# Patient Record
Sex: FEMALE
[De-identification: names, ages, dates, MRNs, and addresses within clinical notes are randomized per-mention and may not be internally consistent; named-entity substitution may affect disease eponyms.]

---

## 2021-12-13 ENCOUNTER — HOSPITAL ENCOUNTER (OUTPATIENT)
Dept: HOSPITAL 47 - PROCWHC3 | Age: 26
End: 2021-12-13
Attending: OBSTETRICS & GYNECOLOGY
Payer: COMMERCIAL

## 2021-12-13 VITALS — DIASTOLIC BLOOD PRESSURE: 78 MMHG | HEART RATE: 61 BPM | SYSTOLIC BLOOD PRESSURE: 116 MMHG

## 2021-12-13 VITALS — TEMPERATURE: 95.9 F

## 2021-12-13 VITALS — RESPIRATION RATE: 16 BRPM

## 2021-12-13 DIAGNOSIS — O98.519: Primary | ICD-10-CM

## 2021-12-13 DIAGNOSIS — Z3A.00: ICD-10-CM

## 2021-12-13 DIAGNOSIS — U07.1: ICD-10-CM

## 2021-12-13 DIAGNOSIS — Z88.3: ICD-10-CM

## 2021-12-13 PROCEDURE — 99202 OFFICE O/P NEW SF 15 MIN: CPT

## 2021-12-13 PROCEDURE — 96375 TX/PRO/DX INJ NEW DRUG ADDON: CPT

## 2021-12-13 PROCEDURE — 96360 HYDRATION IV INFUSION INIT: CPT

## 2021-12-16 ENCOUNTER — HOSPITAL ENCOUNTER (INPATIENT)
Dept: HOSPITAL 47 - 4FBP | Age: 26
LOS: 1 days | Discharge: HOME | End: 2021-12-17
Attending: OBSTETRICS & GYNECOLOGY | Admitting: OBSTETRICS & GYNECOLOGY
Payer: COMMERCIAL

## 2021-12-16 DIAGNOSIS — Z3A.39: ICD-10-CM

## 2021-12-16 DIAGNOSIS — O26.893: ICD-10-CM

## 2021-12-16 DIAGNOSIS — Z67.41: ICD-10-CM

## 2021-12-16 LAB
BASOPHILS # BLD AUTO: 0 K/UL (ref 0–0.2)
BASOPHILS NFR BLD AUTO: 0 %
EOSINOPHIL # BLD AUTO: 0.1 K/UL (ref 0–0.7)
EOSINOPHIL NFR BLD AUTO: 1 %
ERYTHROCYTE [DISTWIDTH] IN BLOOD BY AUTOMATED COUNT: 4.16 M/UL (ref 3.8–5.4)
ERYTHROCYTE [DISTWIDTH] IN BLOOD: 12.9 % (ref 11.5–15.5)
HCT VFR BLD AUTO: 37.3 % (ref 34–46)
HGB BLD-MCNC: 12.3 GM/DL (ref 11.4–16)
LYMPHOCYTES # SPEC AUTO: 1.5 K/UL (ref 1–4.8)
LYMPHOCYTES NFR SPEC AUTO: 15 %
MCH RBC QN AUTO: 29.5 PG (ref 25–35)
MCHC RBC AUTO-ENTMCNC: 32.9 G/DL (ref 31–37)
MCV RBC AUTO: 89.7 FL (ref 80–100)
MONOCYTES # BLD AUTO: 0.6 K/UL (ref 0–1)
MONOCYTES NFR BLD AUTO: 5 %
NEUTROPHILS # BLD AUTO: 8 K/UL (ref 1.3–7.7)
NEUTROPHILS NFR BLD AUTO: 78 %
PLATELET # BLD AUTO: 258 K/UL (ref 150–450)
WBC # BLD AUTO: 10.3 K/UL (ref 3.8–10.6)

## 2021-12-16 PROCEDURE — 4A0HXCZ MEASUREMENT OF PRODUCTS OF CONCEPTION, CARDIAC RATE, EXTERNAL APPROACH: ICD-10-PCS

## 2021-12-16 PROCEDURE — 86900 BLOOD TYPING SEROLOGIC ABO: CPT

## 2021-12-16 PROCEDURE — 10907ZC DRAINAGE OF AMNIOTIC FLUID, THERAPEUTIC FROM PRODUCTS OF CONCEPTION, VIA NATURAL OR ARTIFICIAL OPENING: ICD-10-PCS

## 2021-12-16 PROCEDURE — 86901 BLOOD TYPING SEROLOGIC RH(D): CPT

## 2021-12-16 PROCEDURE — 0KQM0ZZ REPAIR PERINEUM MUSCLE, OPEN APPROACH: ICD-10-PCS

## 2021-12-16 PROCEDURE — 85025 COMPLETE CBC W/AUTO DIFF WBC: CPT

## 2021-12-16 PROCEDURE — 3E033VJ INTRODUCTION OF OTHER HORMONE INTO PERIPHERAL VEIN, PERCUTANEOUS APPROACH: ICD-10-PCS

## 2021-12-16 PROCEDURE — 86850 RBC ANTIBODY SCREEN: CPT

## 2021-12-16 RX ADMIN — POTASSIUM CHLORIDE SCH MLS/HR: 14.9 INJECTION, SOLUTION INTRAVENOUS at 06:40

## 2021-12-16 RX ADMIN — IBUPROFEN PRN MG: 600 TABLET ORAL at 19:38

## 2021-12-16 RX ADMIN — AMPICILLIN SODIUM SCH MLS/HR: 1 INJECTION, POWDER, FOR SOLUTION INTRAMUSCULAR; INTRAVENOUS at 10:54

## 2021-12-16 RX ADMIN — AMPICILLIN SODIUM SCH MLS/HR: 1 INJECTION, POWDER, FOR SOLUTION INTRAMUSCULAR; INTRAVENOUS at 14:45

## 2021-12-16 RX ADMIN — AMPICILLIN SODIUM SCH: 1 INJECTION, POWDER, FOR SOLUTION INTRAMUSCULAR; INTRAVENOUS at 19:15

## 2021-12-16 RX ADMIN — POTASSIUM CHLORIDE SCH MLS/HR: 14.9 INJECTION, SOLUTION INTRAVENOUS at 14:01

## 2021-12-16 RX ADMIN — DOCUSATE SODIUM AND SENNOSIDES SCH: 50; 8.6 TABLET ORAL at 19:47

## 2021-12-16 RX ADMIN — POTASSIUM CHLORIDE SCH MLS/HR: 14.9 INJECTION, SOLUTION INTRAVENOUS at 13:29

## 2021-12-16 RX ADMIN — BUTORPHANOL TARTRATE PRN MG: 1 INJECTION, SOLUTION INTRAMUSCULAR; INTRAVENOUS at 10:09

## 2021-12-16 RX ADMIN — BUTORPHANOL TARTRATE PRN MG: 1 INJECTION, SOLUTION INTRAMUSCULAR; INTRAVENOUS at 11:54

## 2021-12-16 NOTE — P.HPOB
History of Present Illness


H&P Date: 21


Chief Complaint: induction of labor





26 old  presents at 39 weeks and 2 days for induction of labor.  Her cervix

is 1-2 cm dilated, 70% effaced, -1 station.  She is fred irregularly.  

Fetal heart tones 135 with moderate variability and reactive.  Patient did have 

Covid symptoms started 10 days ago and she tested +8 days ago.





Review of Systems


All systems: negative


Constitutional: Denies chills, Denies fever


Eyes: denies blurred vision, denies pain


Ears, nose, mouth and throat: Denies headache, Denies sore throat


Cardiovascular: Denies chest pain, Denies shortness of breath


Respiratory: Denies cough


Gastrointestinal: Denies abdominal pain, Denies diarrhea, Denies nausea, Denies 

vomiting


Genitourinary: Denies dysuria, Denies hematuria


Musculoskeletal: Denies myalgias


Integumentary: Denies pruritus, Denies rash


Neurological: Denies numbness, Denies weakness


Psychiatric: Denies anxiety, Denies depression


Endocrine: Denies fatigue, Denies weight change





Past Medical History


Additional Past Medical History / Comment(s): Obstetric history: This is her 

first pregnancy.  Blood type is O+, abs negative, rubella immune, RPR 

nonreactive, hepatitis B negative, HIV nonreactive.





Medications and Allergies


                                Home Medications











 Medication  Instructions  Recorded  Confirmed  Type


 


No Known Home Medications  21 History








                                    Allergies











Allergy/AdvReac Type Severity Reaction Status Date / Time


 


No Known Allergies Allergy   Verified 21 06:24














Exam


Osteopathic Statement: *.  No significant issues noted on an osteopathic 

structural exam other than those noted in the History and Physical/Consult.


                                Intake and Output











 12/15/21 12/16/21 12/16/21





 22:59 06:59 14:59


 


Other:   


 


  Weight  113.398 kg 














Heart: Regular rate and rhythm


Lungs: Clear to auscultation bilaterally


Abdomen: Soft, nontender


Extremities: Negative Homans sign





Assessment and Plan


(1) Elective induction of labor planned


Current Visit: Yes   Status: Acute   Code(s): HQA1017 -    SNOMED Code(s): 

854246055


   


Plan: 





1.  Induction of labor with amniotomy and Pitocin


2.  Anticipate normal vaginal delivery

## 2021-12-17 VITALS
HEART RATE: 85 BPM | DIASTOLIC BLOOD PRESSURE: 70 MMHG | RESPIRATION RATE: 16 BRPM | TEMPERATURE: 98.4 F | SYSTOLIC BLOOD PRESSURE: 128 MMHG

## 2021-12-17 LAB
BASOPHILS # BLD AUTO: 0 K/UL (ref 0–0.2)
BASOPHILS NFR BLD AUTO: 0 %
EOSINOPHIL # BLD AUTO: 0 K/UL (ref 0–0.7)
EOSINOPHIL NFR BLD AUTO: 0 %
ERYTHROCYTE [DISTWIDTH] IN BLOOD BY AUTOMATED COUNT: 3.9 M/UL (ref 3.8–5.4)
ERYTHROCYTE [DISTWIDTH] IN BLOOD: 13 % (ref 11.5–15.5)
HCT VFR BLD AUTO: 35.3 % (ref 34–46)
HGB BLD-MCNC: 11.4 GM/DL (ref 11.4–16)
LYMPHOCYTES # SPEC AUTO: 1.9 K/UL (ref 1–4.8)
LYMPHOCYTES NFR SPEC AUTO: 11 %
MCH RBC QN AUTO: 29.3 PG (ref 25–35)
MCHC RBC AUTO-ENTMCNC: 32.4 G/DL (ref 31–37)
MCV RBC AUTO: 90.4 FL (ref 80–100)
MONOCYTES # BLD AUTO: 0.9 K/UL (ref 0–1)
MONOCYTES NFR BLD AUTO: 5 %
NEUTROPHILS # BLD AUTO: 14.7 K/UL (ref 1.3–7.7)
NEUTROPHILS NFR BLD AUTO: 83 %
PLATELET # BLD AUTO: 258 K/UL (ref 150–450)
WBC # BLD AUTO: 17.7 K/UL (ref 3.8–10.6)

## 2021-12-17 RX ADMIN — DOCUSATE SODIUM AND SENNOSIDES SCH EACH: 50; 8.6 TABLET ORAL at 10:32

## 2021-12-17 RX ADMIN — IBUPROFEN PRN MG: 600 TABLET ORAL at 10:31

## 2021-12-17 RX ADMIN — IBUPROFEN PRN MG: 600 TABLET ORAL at 02:51

## 2021-12-17 RX ADMIN — IBUPROFEN PRN MG: 600 TABLET ORAL at 18:50

## 2021-12-17 NOTE — P.PROBDLV
Vaginal Delivery Note





- .


Vaginal Delivery Note: 





26 old  presents at 39 weeks and 2 days for induction of labor.  Her cervix

is 1-2 cm dilated, 70% effaced, -1 station.  She is fred irregularly.  

Fetal heart tones 135 with moderate variability and reactive.  Pitocin was 

started and amniotomy performed at 6:59 AM clear fluid noted.  She started 

getting uncomfortable around 10 AM did get a few doses of Stadol.  She then got 

an epidural and was complete soon after that at 1413.  She was not feeling her 

contractions so we did let her labor down for a few hours.  She did push and 

delivered a viable female infant over midline episiotomy at 1836.  Head 

delivered OA, anterior shoulder delivered gentle downward guidance followed by 

posterior shoulder and rest of body.  Nose and mouth bulb suctioned, cord 

clamped and cut, infant placed mother's abdomen.  Apgars 9, 9, weight 7 pounds. 

Placenta delivered spontaneously, intact with three-vessel cord at 1839.  

Vagina, cervix, perineum inspected.  Second-degree midline laceration was 

repaired with 2-0 Vicryl.  Estimated blood loss 100 mL.  Mother and baby in 

stable condition.

## 2021-12-17 NOTE — P.DS
Providers


Date of admission: 


12/16/21 06:07





Expected date of discharge: 12/17/21


Attending physician: 


Brynn Urrutia





Primary care physician: 


Stated None








- Discharge Diagnosis(es)


(1) Elective induction of labor planned


Current Visit: Yes   Status: Resolved   





(2) COVID-19 affecting puerperium


Current Visit: Yes   Status: Acute   





(3) Normal vaginal delivery


Current Visit: Yes   Status: Acute   


Hospital Course: 





Patient presented for induction of labor.  She underwent a normal vaginal 

delivery.  Postpartum course was uncomplicated.  She'll be discharged home 

postpartum day #1 in stable condition to follow-up with me in 6 weeks.  She did 

have COVID-19 and started with symptoms on December 6 and tested +2 days later. 

She is not having any symptoms any longer.  She denies nausea, vomiting, chest 

pain, shortness of breath or any calf pain.  Her lochia is decreasing and her 

cramping is minimal.





Plan - Discharge Summary


New Discharge Prescriptions: 


New


   Ibuprofen [Motrin] 600 mg PO Q6HR PRN #30 tab


     PRN Reason: Mild Pain (Scale 1 To 3)


Discharge Medication List





Ibuprofen [Motrin] 600 mg PO Q6HR PRN #30 tab 12/17/21 [Rx]








Follow up Appointment(s)/Referral(s): 


Brynn Urrutia DO [Doctor of Osteopathic Medicine] - 01/25/22 11:15 am


Discharge Disposition: HOME SELF-CARE

## 2023-10-18 ENCOUNTER — HOSPITAL ENCOUNTER (OUTPATIENT)
Dept: HOSPITAL 47 - LABWHC1 | Age: 28
Discharge: HOME | End: 2023-10-18
Attending: OBSTETRICS & GYNECOLOGY
Payer: COMMERCIAL

## 2023-10-18 DIAGNOSIS — O98.719: Primary | ICD-10-CM

## 2023-10-18 DIAGNOSIS — Z3A.00: ICD-10-CM

## 2023-10-18 LAB
ALT SERPL-CCNC: 18 U/L (ref 8–44)
AST SERPL-CCNC: 15 U/L (ref 13–35)

## 2023-10-18 PROCEDURE — 36415 COLL VENOUS BLD VENIPUNCTURE: CPT

## 2023-10-18 PROCEDURE — 86360 T CELL ABSOLUTE COUNT/RATIO: CPT

## 2023-10-18 PROCEDURE — 84450 TRANSFERASE (AST) (SGOT): CPT

## 2023-10-18 PROCEDURE — 87536 HIV-1 QUANT&REVRSE TRNSCRPJ: CPT

## 2023-10-18 PROCEDURE — 84460 ALANINE AMINO (ALT) (SGPT): CPT

## 2023-10-19 LAB
CD3+CD4+ CELLS # BLD: 883 CELL/UL (ref 443–1471)
CD3+CD4+ CELLS NFR SPEC: 43 % (ref 35–66)
CD3+CD4+ CELLS/CD3+CD8+ CLL SPEC: 1.6 (ref 1–3.7)
CD3+CD8+ CELLS NFR BLD: 559 CELL/UL (ref 190–832)
CD3+CD8+ CELLS NFR SPEC: 27 % (ref 9–37)
HIV1 RNA # SERPL NAA+PROBE: <20 COPIES/ML (ref ?–20)
HIV1 RNA SPEC NAA+PROBE-LOG#: <1.3 {LOG_COPIES}/ML (ref ?–1.3)